# Patient Record
Sex: FEMALE | Race: WHITE | Employment: FULL TIME | URBAN - METROPOLITAN AREA
[De-identification: names, ages, dates, MRNs, and addresses within clinical notes are randomized per-mention and may not be internally consistent; named-entity substitution may affect disease eponyms.]

---

## 2021-04-05 ENCOUNTER — APPOINTMENT (OUTPATIENT)
Dept: OCCUPATIONAL MEDICINE | Facility: CLINIC | Age: 51
End: 2021-04-05

## 2021-04-05 PROCEDURE — 99499 UNLISTED E&M SERVICE: CPT | Performed by: FAMILY MEDICINE

## 2021-04-05 PROCEDURE — 99000 SPECIMEN HANDLING OFFICE-LAB: CPT | Performed by: FAMILY MEDICINE

## 2021-08-09 ENCOUNTER — APPOINTMENT (OUTPATIENT)
Dept: GENERAL RADIOLOGY | Facility: CLINIC | Age: 51
End: 2021-08-09
Attending: EMERGENCY MEDICINE
Payer: COMMERCIAL

## 2021-08-09 ENCOUNTER — HOSPITAL ENCOUNTER (EMERGENCY)
Facility: CLINIC | Age: 51
Discharge: HOME | End: 2021-08-09
Attending: EMERGENCY MEDICINE | Admitting: EMERGENCY MEDICINE
Payer: COMMERCIAL

## 2021-08-09 VITALS
TEMPERATURE: 98 F | WEIGHT: 220 LBS | HEART RATE: 81 BPM | RESPIRATION RATE: 14 BRPM | DIASTOLIC BLOOD PRESSURE: 78 MMHG | SYSTOLIC BLOOD PRESSURE: 130 MMHG | HEIGHT: 66 IN | BODY MASS INDEX: 35.36 KG/M2 | OXYGEN SATURATION: 93 %

## 2021-08-09 DIAGNOSIS — M54.50 ACUTE BILATERAL LOW BACK PAIN WITHOUT SCIATICA: ICD-10-CM
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
## 2021-08-09 DIAGNOSIS — M25.552 HIP PAIN, LEFT: ICD-10-CM

## 2021-08-09 PROCEDURE — 72100 X-RAY EXAM L-S SPINE 2/3 VWS: CPT

## 2021-08-09 PROCEDURE — 250N000011 HC RX IP 250 OP 636: Performed by: EMERGENCY MEDICINE

## 2021-08-09 PROCEDURE — 99284 EMERGENCY DEPT VISIT MOD MDM: CPT | Mod: 25 | Performed by: EMERGENCY MEDICINE

## 2021-08-09 PROCEDURE — 73502 X-RAY EXAM HIP UNI 2-3 VIEWS: CPT

## 2021-08-09 PROCEDURE — 250N000013 HC RX MED GY IP 250 OP 250 PS 637: Performed by: EMERGENCY MEDICINE

## 2021-08-09 PROCEDURE — 99284 EMERGENCY DEPT VISIT MOD MDM: CPT | Performed by: EMERGENCY MEDICINE

## 2021-08-09 RX ORDER — ACETAMINOPHEN 500 MG
1000 TABLET ORAL ONCE
Status: COMPLETED | OUTPATIENT
Start: 2021-08-09 | End: 2021-08-09

## 2021-08-09 RX ORDER — KETOROLAC TROMETHAMINE 15 MG/ML
15 INJECTION, SOLUTION INTRAMUSCULAR; INTRAVENOUS ONCE
Status: COMPLETED | OUTPATIENT
Start: 2021-08-09 | End: 2021-08-09

## 2021-08-09 RX ADMIN — KETOROLAC TROMETHAMINE 15 MG: 15 INJECTION, SOLUTION INTRAMUSCULAR; INTRAVENOUS at 03:57

## 2021-08-09 RX ADMIN — ACETAMINOPHEN 1000 MG: 500 TABLET, FILM COATED ORAL at 03:57

## 2021-08-09 ASSESSMENT — MIFFLIN-ST. JEOR: SCORE: 1644.66

## 2021-08-09 ASSESSMENT — ENCOUNTER SYMPTOMS
SHORTNESS OF BREATH: 0
ABDOMINAL PAIN: 0
FEVER: 0
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
## 2021-08-09 NOTE — ED TRIAGE NOTES
Patient stepped off of approximately 6 inch ledge. Patient fell to knees. Reports lower back pain and left hip pain.

## 2021-12-30 ENCOUNTER — OFFICE VISIT (OUTPATIENT)
Dept: URGENT CARE | Facility: URGENT CARE | Age: 51
End: 2021-12-30
Payer: COMMERCIAL

## 2021-12-30 VITALS
WEIGHT: 245.6 LBS | DIASTOLIC BLOOD PRESSURE: 82 MMHG | HEART RATE: 85 BPM | RESPIRATION RATE: 18 BRPM | SYSTOLIC BLOOD PRESSURE: 116 MMHG | BODY MASS INDEX: 39.64 KG/M2 | TEMPERATURE: 97.8 F | OXYGEN SATURATION: 99 %

## 2021-12-30 DIAGNOSIS — L08.9 LOCALIZED BACTERIAL SKIN INFECTION: Primary | ICD-10-CM

## 2021-12-30 DIAGNOSIS — B96.89 LOCALIZED BACTERIAL SKIN INFECTION: Primary | ICD-10-CM

## 2021-12-30 PROCEDURE — 99203 OFFICE O/P NEW LOW 30 MIN: CPT | Performed by: PHYSICIAN ASSISTANT

## 2021-12-30 RX ORDER — MUPIROCIN 20 MG/G
OINTMENT TOPICAL 3 TIMES DAILY
Qty: 15 G | Refills: 0 | Status: SHIPPED | OUTPATIENT
Start: 2021-12-30 | End: 2022-01-09

## 2021-12-30 RX ORDER — PEDI MULTIVIT NO.25/FOLIC ACID 300 MCG
TABLET,CHEWABLE ORAL
COMMUNITY

## 2021-12-30 RX ORDER — LISINOPRIL AND HYDROCHLOROTHIAZIDE 12.5; 2 MG/1; MG/1
1 TABLET ORAL DAILY
COMMUNITY
Start: 2021-10-07

## 2021-12-30 ASSESSMENT — ENCOUNTER SYMPTOMS
ABDOMINAL PAIN: 0
RESPIRATORY NEGATIVE: 1
CARDIOVASCULAR NEGATIVE: 1
COLOR CHANGE: 0
MUSCULOSKELETAL NEGATIVE: 1
GASTROINTESTINAL NEGATIVE: 1
CONSTITUTIONAL NEGATIVE: 1
WOUND: 1

## 2021-12-30 NOTE — LETTER
[facility] [place]  [address] [address]  Phone: [phone]  Fax: [phone]    December 30, 2021        Shanell Levin  [address] [place] [id number]          To whom it may concern:    RE: Shanell Levin    Patient was seen and treated today at our clinic. Due to medical reasons, I feel it is appropriate for her to limit repetitive lifting to 15 pounds for the next 7 days.     Please contact me for questions or concerns.      Sincerely,        Barrington Flores PA-C

## 2021-12-31 NOTE — PATIENT INSTRUCTIONS
Tylenol and ibuprofen can be used at the same time for pain control because they work differently.     Take ibuprofen 400 mg every (4) hours or 600 mg every (6) hours for pain control (max 2400 mg per day)    Take acetaminophen 1000 mg every (6) hours for pain control (max 4000 mg per day).    Plan on taking acetaminophen in between doses of ibuprofen. For severe pain, you can take Tylenol and ibuprofen at the same time. Return to clinic or emergency room if pain persists or worsens.    Return to clinic if symptoms persist or worsen, if you develop a fever, worsening abdominal pain, nausea/vomiting.

## 2021-12-31 NOTE — PROGRESS NOTES
"  Assessment & Plan     (L08.9,  B96.89) Localized bacterial skin infection  (primary encounter diagnosis)  Comment:   Plan: mupirocin (BACTROBAN) 2 % external ointment          I feel the patient's presentation and physical exam are consistent with a localized skin infection at the opening of the umbilicus. Considering her history of MRSA, I am starting her on Bactroban. May use bacitracin ointment tonight until able to  the prescription tomorrow.    I feel the patient's mild abdominal discomfort in the upper quadrants is due to repetitive lifting. She has had these symptoms previously which have resolved on their own. Recommend that she wear clothing that can provide compression on the upper abdomen until pain resolves. May take Tylenol and/or ibuprofen as needed for pain control. May also use Voltaren gel over the affected areas for additional relief of pain. Abdominal exam is not consistent with acute abdominal pain/tenderness that would suggest an infection, hepatosplenomegaly, gallbladder disease, peritonitis or acute abdomen. She has some voluntary guarding on exam but no rebound tenderness. No evidence for umbilical hernia on exam today. No abdominal pain with walking. I am providing her with a work note stating that she should avoid lifting over 15 pounds on a repetitive basis for the next 7 days. Recommend that she return to clinic if symptoms persist or worsen.    Return to clinic if symptoms persist or worsen, if you develop a fever, worsening abdominal pain, worsening symptoms of wound infection, or  nausea/vomiting.             BMI:   Estimated body mass index is 39.64 kg/m  as calculated from the following:    Height as of 8/9/21: 1.676 m (5' 6\").    Weight as of this encounter: 111.4 kg (245 lb 9.6 oz).           No follow-ups on file.    Barrington Flores PA-C  [facility]    Rossy Reece is a 51 year old who presents for the following health issues  " Drainage from Incision    HPI   The patient is a 51-year-old female with a past medical history of recurrent headaches, MRSA infection, and hysterectomy 6 years ago who presents with concerns for a skin infection at her incision site which she noticed 2 days ago. 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 States that she noticed her T-shirt sticking to her abdomen last night. She is concerned for a skin infection as well as leaking and drainage from the wound site. She also describes having soreness in the upper abdomen bilaterally. She is concerned that this might be from lifting larger and heavier components repetitively over the last 3 nights. Pain is exacerbated with twisting. No abdominal pain with walking. No fever or chills, no chest pain or shortness of breath, no difficulties with breathing or swallowing, no nausea or vomiting. Normal bowel and bladder function. She is mainly concerned that her previous incision from her hysterectomy is leaking. No history of small bowel obstruction.          Review of Systems   Constitutional: Negative.    HENT: Negative.    Respiratory: Negative.    Cardiovascular: Negative.    Gastrointestinal: Negative.  Negative for abdominal pain.   Musculoskeletal: Negative.    Skin: Positive for wound. Negative for color change, pallor and rash.            Objective    /82 (BP Location: Right arm, Patient Position: Sitting, Cuff Size: Adult Large)   Pulse 85   Temp 97.8  F (36.6  C) (Tympanic)   Resp 18   Wt 111.4 kg (245 lb 9.6 oz)   SpO2 99%   BMI 39.64 kg/m    Body mass index is 39.64 kg/m .  Physical Exam  Constitutional:       General: She is not in acute distress.     Appearance: Normal appearance. She is not ill-appearing, toxic-appearing or diaphoretic.   HENT:      Head: Normocephalic and atraumatic.      Right Ear: No middle ear effusion. No mastoid  tenderness. Tympanic membrane is not injected, perforated, erythematous, retracted or bulging.      Left Ear:  No middle ear effusion. No mastoid tenderness. Tympanic membrane is not injected, perforated, erythematous, retracted or bulging.      Nose: Nose normal. No congestion or rhinorrhea.      Mouth/Throat:      Mouth: Mucous membranes are moist.      Pharynx: No oropharyngeal exudate or posterior oropharyngeal erythema.   Eyes:      General: No scleral icterus.        Right eye: No discharge.         Left eye: No discharge.      Extraocular Movements: Extraocular movements intact.      Conjunctiva/sclera: Conjunctivae normal.   Cardiovascular:      Rate and Rhythm: Normal rate and regular rhythm.      Pulses: Normal pulses.      Heart sounds: Normal heart sounds. No murmur heard.      Pulmonary:      Effort: Pulmonary effort is normal. No respiratory distress.      Breath sounds: Normal breath sounds. No stridor. No wheezing or rhonchi.   Abdominal:      General: Bowel sounds are normal. There is no distension.      Palpations: There is no mass.      Tenderness: There is no abdominal tenderness. There is guarding. There is no rebound.      Hernia: No hernia is present. There is no hernia in the umbilical area or ventral area.          Comments: Mild discomfort in the right upper and left upper quadrants. Has some guarding when pressing on the right and left upper quadrants. No rebound tenderness.   Musculoskeletal:      Cervical back: No muscular tenderness.   Lymphadenopathy:      Cervical:      Right cervical: No superficial, deep or posterior cervical adenopathy.     Left cervical: No superficial, deep or posterior cervical adenopathy.   Skin:     General: Skin is warm and dry.      Findings: Lesion and wound present.             Comments: Small macular lesions at the opening of the umbilicus. No purulent drainage, no open incisions.   Neurological:      General: No focal deficit present.      Mental Status:  She is alert and oriented to person, place, and time.      Sensory: No sensory deficit.      Motor: No weakness.      Coordination: Coordination normal.   Psychiatric:         Mood and Affect: Mood normal.         Behavior: Behavior normal.         Thought Content: Thought content normal.         Judgment: Judgment normal.